# Patient Record
(demographics unavailable — no encounter records)

---

## 2025-04-17 NOTE — DISCUSSION/SUMMARY
[FreeTextEntry1] : Rule out ADHD +/- LD. Will get EEG, ZAYRA and Neuropsych evaluation. RTO prn. Note sent to Dr Hoover (PCP). Total clinician time spent on 4/17/2025 is 49 minutes including preparing to see the patient, obtaining and/or reviewing and confirming history, performing a medically necessary and appropriate examination, counseling and educating the patient and/or family, documenting clinical information in the EHR and communicating and/or referring to other healthcare professionals.

## 2025-04-17 NOTE — HISTORY OF PRESENT ILLNESS
[FreeTextEntry1] : 8 year old male with hyperkinesis, impulsivity, restless behaviors and difficulty with self-control. He gets upset easily and has a low frustration tolerance. In a regular 3rd grade class. Walked and talked on time. PMH -ve. On no meds. NKA. FMH +ve for anxiety in the father. No FMH of epilepsy or ASD. Birth: [place]NSVD no complications.

## 2025-04-17 NOTE — CONSULT LETTER
[Dear  ___] : Dear  [unfilled], [Please see my note below.] : Please see my note below. [Sincerely,] : Sincerely, [FreeTextEntry1] : Thank you for sending  OLIVEIRO LOPEZ  to me for neurological evaluation. This is an initial encounter with a new pt. [FreeTextEntry3] : Dr Isaacs